# Patient Record
Sex: MALE | Race: WHITE | Employment: OTHER | URBAN - METROPOLITAN AREA
[De-identification: names, ages, dates, MRNs, and addresses within clinical notes are randomized per-mention and may not be internally consistent; named-entity substitution may affect disease eponyms.]

---

## 2021-05-02 ENCOUNTER — HOSPITAL ENCOUNTER (EMERGENCY)
Age: 66
Discharge: HOME | End: 2021-05-02
Attending: EMERGENCY MEDICINE
Payer: COMMERCIAL

## 2021-05-02 VITALS
WEIGHT: 175 LBS | TEMPERATURE: 98.1 F | OXYGEN SATURATION: 97 % | HEART RATE: 54 BPM | BODY MASS INDEX: 24.5 KG/M2 | RESPIRATION RATE: 12 BRPM | HEIGHT: 71 IN | DIASTOLIC BLOOD PRESSURE: 72 MMHG | SYSTOLIC BLOOD PRESSURE: 122 MMHG

## 2021-05-02 DIAGNOSIS — R55 NEAR SYNCOPE: Primary | ICD-10-CM

## 2021-05-02 LAB
ABSOLUTE EOS #: 0.4 K/UL (ref 0–0.4)
ABSOLUTE IMMATURE GRANULOCYTE: ABNORMAL K/UL (ref 0–0.3)
ABSOLUTE LYMPH #: 1.5 K/UL (ref 1–4.8)
ABSOLUTE MONO #: 0.9 K/UL (ref 0.1–1.2)
ALBUMIN SERPL-MCNC: 4.4 G/DL (ref 3.5–5.2)
ALBUMIN/GLOBULIN RATIO: 1.6 (ref 1–2.5)
ALP BLD-CCNC: 81 U/L (ref 40–129)
ALT SERPL-CCNC: 29 U/L (ref 5–41)
ANION GAP SERPL CALCULATED.3IONS-SCNC: 7 MMOL/L (ref 9–17)
AST SERPL-CCNC: 27 U/L
BASOPHILS # BLD: 1 % (ref 0–2)
BASOPHILS ABSOLUTE: 0.1 K/UL (ref 0–0.2)
BILIRUB SERPL-MCNC: 0.61 MG/DL (ref 0.3–1.2)
BUN BLDV-MCNC: 14 MG/DL (ref 8–23)
BUN/CREAT BLD: ABNORMAL (ref 9–20)
CALCIUM SERPL-MCNC: 9.6 MG/DL (ref 8.6–10.4)
CHLORIDE BLD-SCNC: 99 MMOL/L (ref 98–107)
CO2: 31 MMOL/L (ref 20–31)
CREAT SERPL-MCNC: 0.8 MG/DL (ref 0.7–1.2)
DIFFERENTIAL TYPE: ABNORMAL
EOSINOPHILS RELATIVE PERCENT: 5 % (ref 1–4)
GFR AFRICAN AMERICAN: >60 ML/MIN
GFR NON-AFRICAN AMERICAN: >60 ML/MIN
GFR SERPL CREATININE-BSD FRML MDRD: ABNORMAL ML/MIN/{1.73_M2}
GFR SERPL CREATININE-BSD FRML MDRD: ABNORMAL ML/MIN/{1.73_M2}
GLUCOSE BLD-MCNC: 140 MG/DL (ref 70–99)
HCT VFR BLD CALC: 46.2 % (ref 41–53)
HEMOGLOBIN: 15 G/DL (ref 13.5–17.5)
IMMATURE GRANULOCYTES: ABNORMAL %
LIPASE: 15 U/L (ref 13–60)
LYMPHOCYTES # BLD: 18 % (ref 24–44)
MCH RBC QN AUTO: 27.9 PG (ref 26–34)
MCHC RBC AUTO-ENTMCNC: 32.5 G/DL (ref 31–37)
MCV RBC AUTO: 86 FL (ref 80–100)
MONOCYTES # BLD: 11 % (ref 2–11)
NRBC AUTOMATED: ABNORMAL PER 100 WBC
PDW BLD-RTO: 15.8 % (ref 12.5–15.4)
PLATELET # BLD: 306 K/UL (ref 140–450)
PLATELET ESTIMATE: ABNORMAL
PMV BLD AUTO: 7.8 FL (ref 6–12)
POTASSIUM SERPL-SCNC: 4.1 MMOL/L (ref 3.7–5.3)
RBC # BLD: 5.37 M/UL (ref 4.5–5.9)
RBC # BLD: ABNORMAL 10*6/UL
SEG NEUTROPHILS: 65 % (ref 36–66)
SEGMENTED NEUTROPHILS ABSOLUTE COUNT: 5.5 K/UL (ref 1.8–7.7)
SODIUM BLD-SCNC: 137 MMOL/L (ref 135–144)
TOTAL PROTEIN: 7.2 G/DL (ref 6.4–8.3)
TROPONIN INTERP: NORMAL
TROPONIN T: NORMAL NG/ML
TROPONIN, HIGH SENSITIVITY: <6 NG/L (ref 0–22)
WBC # BLD: 8.3 K/UL (ref 3.5–11)
WBC # BLD: ABNORMAL 10*3/UL

## 2021-05-02 PROCEDURE — 84484 ASSAY OF TROPONIN QUANT: CPT

## 2021-05-02 PROCEDURE — 80053 COMPREHEN METABOLIC PANEL: CPT

## 2021-05-02 PROCEDURE — 93005 ELECTROCARDIOGRAM TRACING: CPT | Performed by: NURSE PRACTITIONER

## 2021-05-02 PROCEDURE — 99282 EMERGENCY DEPT VISIT SF MDM: CPT

## 2021-05-02 PROCEDURE — 83690 ASSAY OF LIPASE: CPT

## 2021-05-02 PROCEDURE — 2580000003 HC RX 258: Performed by: NURSE PRACTITIONER

## 2021-05-02 PROCEDURE — 85025 COMPLETE CBC W/AUTO DIFF WBC: CPT

## 2021-05-02 PROCEDURE — 36415 COLL VENOUS BLD VENIPUNCTURE: CPT

## 2021-05-02 PROCEDURE — 96360 HYDRATION IV INFUSION INIT: CPT

## 2021-05-02 RX ORDER — LOSARTAN POTASSIUM 25 MG/1
12.5 TABLET ORAL DAILY
COMMUNITY

## 2021-05-02 RX ORDER — 0.9 % SODIUM CHLORIDE 0.9 %
1000 INTRAVENOUS SOLUTION INTRAVENOUS ONCE
Status: COMPLETED | OUTPATIENT
Start: 2021-05-02 | End: 2021-05-02

## 2021-05-02 RX ADMIN — SODIUM CHLORIDE 1000 ML: 9 INJECTION, SOLUTION INTRAVENOUS at 12:29

## 2021-05-02 ASSESSMENT — ENCOUNTER SYMPTOMS
NAUSEA: 1
DIARRHEA: 0
BACK PAIN: 0
ABDOMINAL PAIN: 0
SHORTNESS OF BREATH: 0
VOMITING: 0

## 2021-05-02 NOTE — ED PROVIDER NOTES
[address] [facility] ED    [address] [facility] [address]  Phone: [phone]  Fax: [phone]  Emergency Department  Faculty Attestation    I performed a history and physical examination of the patient and discussed management with the mid level provideer. I reviewed the mid level provider's note and agree with the documented findings and plan of care. Any areas of disagreement are noted on the chart. I was personally present for the key portions of any procedures. I have documented in the chart those procedures where I was not present during the key portions. I have reviewed the emergency nurses triage note. I agree with the chief complaint, past medical history, past surgical history, allergies, medications, social and family history as documented unless otherwise noted below. Documentation of the HPI, Physical Exam and Medical Decision Making performed by medical students or scribes is based on my personal performance of the HPI, PE and MDM. For Physician Assistant/ Nurse Practitioner cases/documentation I have personally evaluated this patient and have completed at least one if not all key elements of the E/M (history, physical exam, and MDM). Additional findings are as noted.       Primary Care Physician:  [address] [place] [address]       Chief Complaint   Patient presents with    Loss of Consciousness     NEAR       RECENT VITALS:   Temp: 98.1 °F (36.7 °C),  Pulse: 54, Resp: 12, BP: 122/72    LABS:  Labs Reviewed   COMPREHENSIVE METABOLIC PANEL - Abnormal; Notable for the following components:       Result Value    Glucose 140 (*)     Anion Gap 7 (*)     All other components within normal limits   CBC WITH AUTO DIFFERENTIAL - Abnormal; Notable for the following components:    RDW 15.8 (*)     Lymphocytes 18 (*)     Eosinophils % 5 (*)     All other components within normal limits   LIPASE   TROPONIN         PERTINENT ATTENDING PHYSICIAN COMMENTS:    EKG: Emergency physician interpretation: Sinus 57 with no ischemia. Incomplete right bundle branch block noted. T wave is flatter in V2 compared to 5 years ago. Axis 60, , QRS 96, . The patient presents with a near syncopal episode this afternoon while he was using a weed [employer]. He is a diabetic but his blood sugar did not drop. He did not have any focal weakness or numbness. He denies chest pain or shortness of breath. He feels back to normal now. He felt a little bit short shaky prior to arrival.  On exam, the patient is to be no stress he has no focal deficits. He has normal heart and lung sounds. His lab work is reassuring as is his EKG. I think the patient likely is having some issues with staying hydrated when he is exerting himself. There is no evidence of acute focal deficits. He is asked to follow-up with his doctor. He is discharged in good condition.      Cayetano Bowser MD  05/02/21 6619

## 2021-05-02 NOTE — ED PROVIDER NOTES
[address] [facility] ED  Emergency Department Encounter  Mid Level Provider     Pt Name: Modesto Hammond  MRN: [id number]  [place] 1955  Date of evaluation: 5/2/21  PCP:  [address] [place] [address]       Chief Complaint   Patient presents with    Loss of Consciousness     NEAR       HISTORY OF PRESENT ILLNESS  (Location/Symptom, Timing/Onset,Context/Setting, Quality, Duration, Modifying Factors, Severity.)      Modesto Hammond is a 76 y.o. male who presents with episode of near syncope. He was outside working in the lawn and felt nauseated with generalized weakness. He walked into the home and his wife reported he looked pale. She did check his blood pressure and it was 92 systolic. This is low for him. Patient is a type I diabetic and his blood sugar was 129. Patient does have history of TIA and aortic stenosis. He is on Plavix. Denies history of MI. Patient was recently started on a new blood pressure medication 1 month ago. Patient has had a syncopal in the past but it was 10 years ago. Wife did give him some Gatorade. He is starting to feel better without treatment. PAST MEDICAL /SURGICAL / SOCIAL / FAMILY HISTORY      has a past medical history of Diabetes mellitus ([facility] [address] Hyperlipidemia, and Stroke ([facility] [address] has no past surgical history on file.     Social History     Socioeconomic History    Marital status: Single     Spouse name: Not on file    Number of children: Not on file    Years of education: Not on file    Highest education level: Not on file   Occupational History    Not on file   Social Needs    Financial resource strain: Not on file    Food insecurity     Worry: Not on file     Inability: Not on file    Transportation needs     Medical: Not on file     Non-medical: Not on file   Tobacco Use    Smoking status: Never Smoker   Substance and Sexual Activity    Alcohol use: No    Drug use: Not on file    Sexual activity: Not on file   Lifestyle    Physical activity nausea. Negative for abdominal pain, diarrhea and vomiting. Musculoskeletal: Negative for back pain. Allergic/Immunologic: Negative for immunocompromised state. Neurological: Positive for dizziness, syncope (Near) and weakness (Generalized). PHYSICALEXAM   (upto 7 for level 4, 8 or more for level 5)      INITIAL VITALS:  height is 5' 11\" (1.803 m) and weight is 79.4 kg (175 lb). His oral temperature is 98.1 °F (36.7 °C). His blood pressure is 122/72 and his pulse is 54. His respiration is 12 and oxygen saturation is 97%. Physical Exam  Vitals signs and nursing note reviewed. Constitutional:       General: He is not in acute distress. Appearance: He is well-developed. HENT:      Head: Normocephalic and atraumatic. Neck:      Musculoskeletal: Normal range of motion and neck supple. Cardiovascular:      Rate and Rhythm: Normal rate. Pulmonary:      Effort: Pulmonary effort is normal. No respiratory distress. Abdominal:      Palpations: Abdomen is soft. Tenderness: There is no abdominal tenderness. There is no guarding or rebound. Skin:     General: Skin is warm and dry. Neurological:      General: No focal deficit present. Mental Status: He is alert and oriented to person, place, and time. Psychiatric:         Mood and Affect: Mood normal.         Behavior: Behavior normal.         Thought Content:  Thought content normal.         Judgment: Judgment normal.         DIFFERENTIAL  DIAGNOSIS   Hypotension, hypoglycemia, dehydration, near syncope, dysrhythmia    PLAN (LABS / IMAGING / EKG):  Orders Placed This Encounter   Procedures    Comprehensive Metabolic Panel    Lipase    CBC Auto Differential    Troponin    EKG 12 Lead    Insert peripheral IV       MEDICATIONS ORDERED:  Orders Placed This Encounter   Medications    0.9 % sodium chloride bolus       Controlled Substances Monitoring:      DIAGNOSTIC RESULTS / EMERGENCY DEPARTMENT COURSE / MDM     RADIOLOGY:   I directly visualized(with the attending physician) the following  images and reviewed the radiologist interpretations:  No results found.     No orders to display       LABS:  Results for orders placed or performed during the hospital encounter of 05/02/21   Comprehensive Metabolic Panel   Result Value Ref Range    Glucose 140 (H) 70 - 99 mg/dL    BUN 14 8 - 23 mg/dL    CREATININE 0.80 0.70 - 1.20 mg/dL    Bun/Cre Ratio NOT REPORTED 9 - 20    Calcium 9.6 8.6 - 10.4 mg/dL    Sodium 137 135 - 144 mmol/L    Potassium 4.1 3.7 - 5.3 mmol/L    Chloride 99 98 - 107 mmol/L    CO2 31 20 - 31 mmol/L    Anion Gap 7 (L) 9 - 17 mmol/L    Alkaline Phosphatase 81 40 - 129 U/L    ALT 29 5 - 41 U/L    AST 27 <40 U/L    Total Bilirubin 0.61 0.3 - 1.2 mg/dL    Total Protein 7.2 6.4 - 8.3 g/dL    Albumin 4.4 3.5 - 5.2 g/dL    Albumin/Globulin Ratio 1.6 1.0 - 2.5    GFR Non-African American >60 >60 mL/min    GFR African American >60 >60 mL/min    GFR Comment          GFR Staging NOT REPORTED    Lipase   Result Value Ref Range    Lipase 15 13 - 60 U/L   CBC Auto Differential   Result Value Ref Range    WBC 8.3 3.5 - 11.0 k/uL    RBC 5.37 4.5 - 5.9 m/uL    Hemoglobin 15.0 13.5 - 17.5 g/dL    Hematocrit 46.2 41 - 53 %    MCV 86.0 80 - 100 fL    MCH 27.9 26 - 34 pg    MCHC 32.5 31 - 37 g/dL    RDW 15.8 (H) 12.5 - 15.4 %    Platelets 847 463 - 392 k/uL    MPV 7.8 6.0 - 12.0 fL    NRBC Automated NOT REPORTED per 100 WBC    Differential Type NOT REPORTED     Seg Neutrophils 65 36 - 66 %    Lymphocytes 18 (L) 24 - 44 %    Monocytes 11 2 - 11 %    Eosinophils % 5 (H) 1 - 4 %    Basophils 1 0 - 2 %    Immature Granulocytes NOT REPORTED 0 %    Segs Absolute 5.50 1.8 - 7.7 k/uL    Absolute Lymph # 1.50 1.0 - 4.8 k/uL    Absolute Mono # 0.90 0.1 - 1.2 k/uL    Absolute Eos # 0.40 0.0 - 0.4 k/uL    Basophils Absolute 0.10 0.0 - 0.2 k/uL    Absolute Immature Granulocyte NOT REPORTED 0.00 - 0.30 k/uL    WBC Morphology NOT REPORTED     RBC Morphology NOT

## 2021-05-04 LAB
EKG ATRIAL RATE: 57 BPM
EKG P AXIS: 59 DEGREES
EKG P-R INTERVAL: 152 MS
EKG Q-T INTERVAL: 416 MS
EKG QRS DURATION: 96 MS
EKG QTC CALCULATION (BAZETT): 404 MS
EKG R AXIS: 60 DEGREES
EKG T AXIS: 72 DEGREES
EKG VENTRICULAR RATE: 57 BPM

## 2021-06-03 ASSESSMENT — ENCOUNTER SYMPTOMS
BACK PAIN: 0
NAUSEA: 1
ABDOMINAL PAIN: 0
SHORTNESS OF BREATH: 0
DIARRHEA: 0
VOMITING: 0

## 2021-07-29 ENCOUNTER — APPOINTMENT (OUTPATIENT)
Dept: GENERAL RADIOLOGY | Age: 66
End: 2021-07-29
Payer: COMMERCIAL

## 2021-07-29 ENCOUNTER — HOSPITAL ENCOUNTER (EMERGENCY)
Age: 66
Discharge: HOME | End: 2021-07-29
Attending: EMERGENCY MEDICINE
Payer: COMMERCIAL

## 2021-07-29 VITALS
BODY MASS INDEX: 24.5 KG/M2 | OXYGEN SATURATION: 96 % | WEIGHT: 175 LBS | HEIGHT: 71 IN | HEART RATE: 44 BPM | RESPIRATION RATE: 15 BRPM | SYSTOLIC BLOOD PRESSURE: 139 MMHG | DIASTOLIC BLOOD PRESSURE: 68 MMHG | TEMPERATURE: 98.2 F

## 2021-07-29 DIAGNOSIS — R42 DIZZINESS: Primary | ICD-10-CM

## 2021-07-29 DIAGNOSIS — R42 LIGHTHEADEDNESS: ICD-10-CM

## 2021-07-29 LAB
ABSOLUTE EOS #: 0.4 K/UL (ref 0–0.4)
ABSOLUTE IMMATURE GRANULOCYTE: ABNORMAL K/UL (ref 0–0.3)
ABSOLUTE LYMPH #: 1.7 K/UL (ref 1–4.8)
ABSOLUTE MONO #: 0.7 K/UL (ref 0.1–1.2)
ANION GAP SERPL CALCULATED.3IONS-SCNC: 9 MMOL/L (ref 9–17)
BASOPHILS # BLD: 1 % (ref 0–2)
BASOPHILS ABSOLUTE: 0.1 K/UL (ref 0–0.2)
BUN BLDV-MCNC: 16 MG/DL (ref 8–23)
BUN/CREAT BLD: ABNORMAL (ref 9–20)
CALCIUM SERPL-MCNC: 9.5 MG/DL (ref 8.6–10.4)
CHLORIDE BLD-SCNC: 95 MMOL/L (ref 98–107)
CO2: 29 MMOL/L (ref 20–31)
CREAT SERPL-MCNC: 0.82 MG/DL (ref 0.7–1.2)
DIFFERENTIAL TYPE: ABNORMAL
EOSINOPHILS RELATIVE PERCENT: 5 % (ref 1–4)
GFR AFRICAN AMERICAN: >60 ML/MIN
GFR NON-AFRICAN AMERICAN: >60 ML/MIN
GFR SERPL CREATININE-BSD FRML MDRD: ABNORMAL ML/MIN/{1.73_M2}
GFR SERPL CREATININE-BSD FRML MDRD: ABNORMAL ML/MIN/{1.73_M2}
GLUCOSE BLD-MCNC: 109 MG/DL (ref 70–99)
HCT VFR BLD CALC: 43.8 % (ref 41–53)
HEMOGLOBIN: 14.5 G/DL (ref 13.5–17.5)
IMMATURE GRANULOCYTES: ABNORMAL %
LYMPHOCYTES # BLD: 22 % (ref 24–44)
MCH RBC QN AUTO: 29.5 PG (ref 26–34)
MCHC RBC AUTO-ENTMCNC: 33 G/DL (ref 31–37)
MCV RBC AUTO: 89.3 FL (ref 80–100)
MONOCYTES # BLD: 10 % (ref 2–11)
NRBC AUTOMATED: ABNORMAL PER 100 WBC
PDW BLD-RTO: 13.8 % (ref 12.5–15.4)
PLATELET # BLD: 286 K/UL (ref 140–450)
PLATELET ESTIMATE: ABNORMAL
PMV BLD AUTO: 7.8 FL (ref 6–12)
POTASSIUM SERPL-SCNC: 4.2 MMOL/L (ref 3.7–5.3)
RBC # BLD: 4.9 M/UL (ref 4.5–5.9)
RBC # BLD: ABNORMAL 10*6/UL
SEG NEUTROPHILS: 62 % (ref 36–66)
SEGMENTED NEUTROPHILS ABSOLUTE COUNT: 4.8 K/UL (ref 1.8–7.7)
SODIUM BLD-SCNC: 133 MMOL/L (ref 135–144)
TROPONIN INTERP: NORMAL
TROPONIN T: NORMAL NG/ML
TROPONIN, HIGH SENSITIVITY: 8 NG/L (ref 0–22)
WBC # BLD: 7.6 K/UL (ref 3.5–11)
WBC # BLD: ABNORMAL 10*3/UL

## 2021-07-29 PROCEDURE — 71046 X-RAY EXAM CHEST 2 VIEWS: CPT

## 2021-07-29 PROCEDURE — 84484 ASSAY OF TROPONIN QUANT: CPT

## 2021-07-29 PROCEDURE — 85025 COMPLETE CBC W/AUTO DIFF WBC: CPT

## 2021-07-29 PROCEDURE — 80048 BASIC METABOLIC PNL TOTAL CA: CPT

## 2021-07-29 PROCEDURE — 99284 EMERGENCY DEPT VISIT MOD MDM: CPT

## 2021-07-29 PROCEDURE — 36415 COLL VENOUS BLD VENIPUNCTURE: CPT

## 2021-07-29 PROCEDURE — 93005 ELECTROCARDIOGRAM TRACING: CPT | Performed by: PHYSICIAN ASSISTANT

## 2021-07-29 RX ORDER — ASPIRIN 81 MG/1
81 TABLET, CHEWABLE ORAL DAILY
COMMUNITY
End: 2022-08-31

## 2021-07-29 RX ORDER — METOPROLOL SUCCINATE 50 MG/1
50 TABLET, EXTENDED RELEASE ORAL DAILY
COMMUNITY
End: 2021-10-09

## 2021-07-29 NOTE — ED TRIAGE NOTES
Pt reports having cardiac cath on Monday at [facility] for A-Fib. Pt reports lightheadedness and dizziness x 4 days intermittently. Pt denies CP, SOB, N/V/D, HA, fever/chills, blurred vision.

## 2021-07-29 NOTE — ED PROVIDER NOTES
[address] [facility] ED  [address] [facility] [address]  Phone: [phone]  Fax: [phone]      eMERGENCY dEPARTMENT eNCOUnter      Pt Name: Rebecca Ferreira  MRN: [id number]  [place] 1955  Date of evaluation: 7/29/21      CHIEF COMPLAINT:  Chief Complaint   Patient presents with    Dizziness     intermittent x 4 days. HISTORY OF PRESENT ILLNESS    Rebecca Ferreira is a 76 y.o. male who presents with evaluation for DIZZINESS:       Context/Timing:  Lightheadedness while at [employer] day today. Was walking on slanted sidewalk and began to feel lightheadedness. No CP/palps or resp symptoms. History of lightheadedness, had stress and then Cardiac Cath on Monday of this week. No stents but started on Eliquis and Metoprolol. Hx dizziness? YES   Recent URI? NO  Ear pain? NO  Fall? NO  Head injury? NO  LOC? NO  Headache? NO  Vision changes? NO  Ataxia/instability? NO  TIA or CVA hx?     NO  Abd pain? NO  Nausea? YES  /  NO  Vomiting? NO  Neck/Back pain? NO  Paresthesias? NO  Chest pain? NO  Palpitations? NO  Cardiac hx? HTN, Afib      Recent med adjustments/changes? YES    Quality:  As above  Duration:  intermittent  Modifying Factors:  As above  Severity:   moderate    Nursing Notes were reviewed. REVIEW OF SYSTEMS       Constitutional: Denies recent fever, chills. HENT: per HPI  Neck: per HPI  Respiratory: Denies recent shortness of breath. Cardiac:  Per HPI  GI:  Per HPI. : Denies dysuria. Musculoskeletal: Per HPI  Neurologic:  Per HPI  Skin:  Denies any rash. Negative in 10 essential Systems except as mentioned above and in the HPI. PAST MEDICAL HISTORY   PMH:  has a past medical history of A-fib ([address] Diabetes mellitus ([address] Hyperlipidemia, and Stroke ([facility] [address] Surgical History:  has a past surgical history that includes Cardiac catheterization.   Social History:  reports that he has B-blocker started on Monday, this is likely source of bradycardia. 2100  Attending to complete all remaining care, diagnosis and disposition for this patient. We discussed this patient prior to my departure. My note will be refreshed to reflect these details, though I was not actively involved in this patient's care following the time stamp above. I have reviewed the disposition diagnosis with the patient and or their family/guardian. I have answered their questions and given discharge instructions. They voiced understanding of these instructions and did not have any further questions or complaints. This pt presents with the no signs of central etiology- ataxia, significant HA, localized weakness or other neurosensory deficits. No orders of the defined types were placed in this encounter. CONSULTS:  None      FINAL IMPRESSION      1. Dizziness    2.  Lightheadedness          DISPOSITION/PLAN:  DISPOSITION Decision To Discharge 07/29/2021 10:38:54 PM        PATIENT REFERRED TO:  [facility]  [address] [facility] [place],[facility] [address], [facility] [address]  [address]  [phone]    Call in 1 day        DISCHARGE MEDICATIONS:  Discharge Medication List as of 7/29/2021 10:55 PM          (Please note that portions of this note were completed with a voice recognition program.  Efforts were made to edit the dictations but occasionally words are mis-transcribed.)    CARMITA Gomez PA-C  08/03/21 0256

## 2021-07-30 LAB
EKG ATRIAL RATE: 44 BPM
EKG P AXIS: 49 DEGREES
EKG P-R INTERVAL: 184 MS
EKG Q-T INTERVAL: 460 MS
EKG QRS DURATION: 96 MS
EKG QTC CALCULATION (BAZETT): 393 MS
EKG R AXIS: 53 DEGREES
EKG T AXIS: 61 DEGREES
EKG VENTRICULAR RATE: 44 BPM

## 2021-07-30 NOTE — ED PROVIDER NOTES
[address] [facility] ED     Emergency Department     Faculty Attestation        I performed a history and physical examination of the patient and discussed management with the resident. I reviewed the residents note and agree with the documented findings and plan of care. Any areas of disagreement are noted on the chart. I was personally present for the key portions of any procedures. I have documented in the chart those procedures where I was not present during the key portions. I have reviewed the emergency nurses triage note. I agree with the chief complaint, past medical history, past surgical history, allergies, medications, social and family history as documented unless otherwise noted below. Documentation of the HPI, Physical Exam and Medical Decision Making performed by medical students or scribes is based on my personal performance of the HPI, PE and MDM. For Physician Assistant/ Nurse Practitioner cases/documentation I have have had a face to face evaluation with this patient and have completed at least one if not all key elements of the E/M (history, physical exam, and MDM). Additional findings are as noted. Vital Signs: BP (!) 155/72   Pulse (!) 46   Temp 98.2 °F (36.8 °C) (Oral)   Resp 15   Ht 5' 11\" (1.803 m)   Wt 79.4 kg (175 lb)   SpO2 97%   BMI 24.41 kg/m²   PCP:  Cy Yun    Pertinent Comments:     History: This is a 59-year-old male who presents today for dizziness that has been intermittent for the last 4 days. He has been put on some new medications for blood pressure. Exam: Patient's vitals show hypertension and bradycardia. The rest of his vitals are stable. His exam otherwise is benign for me at this time.     EKG Interpretation    Interpreted by me    Rhythm: Sinus bradycardia  Rate: Bradycardic  Axis: normal  Ectopy: none  Conduction: normal  ST Segments: no acute change  T Waves: no acute change  Q Waves: none    Clinical

## 2021-07-30 NOTE — ED NOTES
Pt given written and verbal discharge, f/u instructions. Pt verbalizes understanding. Pt is ambulatory with steady gait accompanied by wife.       Juan Manuel Feliciano RN  07/29/21 4233

## 2021-10-09 ENCOUNTER — HOSPITAL ENCOUNTER (EMERGENCY)
Age: 66
Discharge: HOME | End: 2021-10-09
Attending: EMERGENCY MEDICINE
Payer: COMMERCIAL

## 2021-10-09 ENCOUNTER — APPOINTMENT (OUTPATIENT)
Dept: CT IMAGING | Age: 66
End: 2021-10-09
Payer: COMMERCIAL

## 2021-10-09 VITALS
OXYGEN SATURATION: 99 % | TEMPERATURE: 97.6 F | WEIGHT: 175 LBS | HEIGHT: 71 IN | SYSTOLIC BLOOD PRESSURE: 150 MMHG | RESPIRATION RATE: 16 BRPM | DIASTOLIC BLOOD PRESSURE: 73 MMHG | HEART RATE: 55 BPM | BODY MASS INDEX: 24.5 KG/M2

## 2021-10-09 DIAGNOSIS — M54.50 ACUTE BILATERAL LOW BACK PAIN WITHOUT SCIATICA: Primary | ICD-10-CM

## 2021-10-09 LAB
ABSOLUTE EOS #: 0.1 K/UL (ref 0–0.4)
ABSOLUTE IMMATURE GRANULOCYTE: ABNORMAL K/UL (ref 0–0.3)
ABSOLUTE LYMPH #: 1.2 K/UL (ref 1–4.8)
ABSOLUTE MONO #: 0.7 K/UL (ref 0.1–1.2)
ALBUMIN SERPL-MCNC: 4.2 G/DL (ref 3.5–5.2)
ALBUMIN/GLOBULIN RATIO: 1.8 (ref 1–2.5)
ALP BLD-CCNC: 61 U/L (ref 40–129)
ALT SERPL-CCNC: 20 U/L (ref 5–41)
ANION GAP SERPL CALCULATED.3IONS-SCNC: 9 MMOL/L (ref 9–17)
AST SERPL-CCNC: 19 U/L
BASOPHILS # BLD: 1 % (ref 0–2)
BASOPHILS ABSOLUTE: 0.1 K/UL (ref 0–0.2)
BILIRUB SERPL-MCNC: 0.71 MG/DL (ref 0.3–1.2)
BILIRUBIN URINE: NEGATIVE
BUN BLDV-MCNC: 8 MG/DL (ref 8–23)
BUN/CREAT BLD: ABNORMAL (ref 9–20)
CALCIUM SERPL-MCNC: 9.3 MG/DL (ref 8.6–10.4)
CHLORIDE BLD-SCNC: 95 MMOL/L (ref 98–107)
CO2: 28 MMOL/L (ref 20–31)
COLOR: YELLOW
COMMENT UA: NORMAL
CREAT SERPL-MCNC: 0.63 MG/DL (ref 0.7–1.2)
DIFFERENTIAL TYPE: ABNORMAL
EOSINOPHILS RELATIVE PERCENT: 2 % (ref 1–4)
GFR AFRICAN AMERICAN: >60 ML/MIN
GFR NON-AFRICAN AMERICAN: >60 ML/MIN
GFR SERPL CREATININE-BSD FRML MDRD: ABNORMAL ML/MIN/{1.73_M2}
GFR SERPL CREATININE-BSD FRML MDRD: ABNORMAL ML/MIN/{1.73_M2}
GLUCOSE BLD-MCNC: 90 MG/DL (ref 70–99)
GLUCOSE URINE: NEGATIVE
HCT VFR BLD CALC: 45.2 % (ref 41–53)
HEMOGLOBIN: 15.1 G/DL (ref 13.5–17.5)
IMMATURE GRANULOCYTES: ABNORMAL %
KETONES, URINE: NEGATIVE
LEUKOCYTE ESTERASE, URINE: NEGATIVE
LIPASE: 18 U/L (ref 13–60)
LYMPHOCYTES # BLD: 18 % (ref 24–44)
MCH RBC QN AUTO: 29.8 PG (ref 26–34)
MCHC RBC AUTO-ENTMCNC: 33.4 G/DL (ref 31–37)
MCV RBC AUTO: 89.2 FL (ref 80–100)
MONOCYTES # BLD: 11 % (ref 2–11)
NITRITE, URINE: NEGATIVE
NRBC AUTOMATED: ABNORMAL PER 100 WBC
PDW BLD-RTO: 13.1 % (ref 12.5–15.4)
PH UA: 6.5 (ref 5–8)
PLATELET # BLD: 287 K/UL (ref 140–450)
PLATELET ESTIMATE: ABNORMAL
PMV BLD AUTO: 7.4 FL (ref 6–12)
POTASSIUM SERPL-SCNC: 4.2 MMOL/L (ref 3.7–5.3)
PROTEIN UA: NEGATIVE
RBC # BLD: 5.07 M/UL (ref 4.5–5.9)
RBC # BLD: ABNORMAL 10*6/UL
SEG NEUTROPHILS: 68 % (ref 36–66)
SEGMENTED NEUTROPHILS ABSOLUTE COUNT: 4.5 K/UL (ref 1.8–7.7)
SODIUM BLD-SCNC: 132 MMOL/L (ref 135–144)
SPECIFIC GRAVITY UA: 1.01 (ref 1–1.03)
TOTAL PROTEIN: 6.5 G/DL (ref 6.4–8.3)
TURBIDITY: CLEAR
URINE HGB: NEGATIVE
UROBILINOGEN, URINE: NORMAL
WBC # BLD: 6.6 K/UL (ref 3.5–11)
WBC # BLD: ABNORMAL 10*3/UL

## 2021-10-09 PROCEDURE — 83690 ASSAY OF LIPASE: CPT

## 2021-10-09 PROCEDURE — 85025 COMPLETE CBC W/AUTO DIFF WBC: CPT

## 2021-10-09 PROCEDURE — 2580000003 HC RX 258: Performed by: EMERGENCY MEDICINE

## 2021-10-09 PROCEDURE — 36415 COLL VENOUS BLD VENIPUNCTURE: CPT

## 2021-10-09 PROCEDURE — 2580000003 HC RX 258: Performed by: PHYSICIAN ASSISTANT

## 2021-10-09 PROCEDURE — 80053 COMPREHEN METABOLIC PANEL: CPT

## 2021-10-09 PROCEDURE — 99285 EMERGENCY DEPT VISIT HI MDM: CPT

## 2021-10-09 PROCEDURE — 74177 CT ABD & PELVIS W/CONTRAST: CPT

## 2021-10-09 PROCEDURE — 81003 URINALYSIS AUTO W/O SCOPE: CPT

## 2021-10-09 PROCEDURE — 6360000002 HC RX W HCPCS: Performed by: PHYSICIAN ASSISTANT

## 2021-10-09 PROCEDURE — 6360000004 HC RX CONTRAST MEDICATION: Performed by: EMERGENCY MEDICINE

## 2021-10-09 PROCEDURE — 96372 THER/PROPH/DIAG INJ SC/IM: CPT

## 2021-10-09 PROCEDURE — 2500000003 HC RX 250 WO HCPCS

## 2021-10-09 RX ORDER — SODIUM CHLORIDE 0.9 % (FLUSH) 0.9 %
10 SYRINGE (ML) INJECTION 2 TIMES DAILY
Status: DISCONTINUED | OUTPATIENT
Start: 2021-10-09 | End: 2021-10-09 | Stop reason: HOSPADM

## 2021-10-09 RX ORDER — ORPHENADRINE CITRATE 100 MG/1
100 TABLET, EXTENDED RELEASE ORAL 2 TIMES DAILY
Qty: 14 TABLET | Refills: 0 | Status: SHIPPED | OUTPATIENT
Start: 2021-10-09 | End: 2022-08-31

## 2021-10-09 RX ORDER — ORPHENADRINE CITRATE 30 MG/ML
60 INJECTION INTRAMUSCULAR; INTRAVENOUS ONCE
Status: COMPLETED | OUTPATIENT
Start: 2021-10-09 | End: 2021-10-09

## 2021-10-09 RX ORDER — 0.9 % SODIUM CHLORIDE 0.9 %
1000 INTRAVENOUS SOLUTION INTRAVENOUS ONCE
Status: COMPLETED | OUTPATIENT
Start: 2021-10-09 | End: 2021-10-09

## 2021-10-09 RX ORDER — KETOROLAC TROMETHAMINE 15 MG/ML
15 INJECTION, SOLUTION INTRAMUSCULAR; INTRAVENOUS ONCE
Status: DISCONTINUED | OUTPATIENT
Start: 2021-10-09 | End: 2021-10-09 | Stop reason: HOSPADM

## 2021-10-09 RX ORDER — DEXTROSE MONOHYDRATE 25 G/50ML
INJECTION, SOLUTION INTRAVENOUS
Status: COMPLETED
Start: 2021-10-09 | End: 2021-10-09

## 2021-10-09 RX ORDER — LIDOCAINE 50 MG/G
1-2 PATCH TOPICAL DAILY
Qty: 15 PATCH | Refills: 0 | Status: SHIPPED | OUTPATIENT
Start: 2021-10-09 | End: 2022-08-31

## 2021-10-09 RX ORDER — DEXTROSE MONOHYDRATE 25 G/50ML
7.25 INJECTION, SOLUTION INTRAVENOUS ONCE
Status: COMPLETED | OUTPATIENT
Start: 2021-10-09 | End: 2021-10-09

## 2021-10-09 RX ADMIN — IOPAMIDOL 75 ML: 755 INJECTION, SOLUTION INTRAVENOUS at 13:20

## 2021-10-09 RX ADMIN — SODIUM CHLORIDE, PRESERVATIVE FREE 10 ML: 5 INJECTION INTRAVENOUS at 13:20

## 2021-10-09 RX ADMIN — SODIUM CHLORIDE 1000 ML: 9 INJECTION, SOLUTION INTRAVENOUS at 13:15

## 2021-10-09 RX ADMIN — ORPHENADRINE CITRATE 60 MG: 30 INJECTION INTRAMUSCULAR; INTRAVENOUS at 12:18

## 2021-10-09 RX ADMIN — DEXTROSE MONOHYDRATE 7.25 G: 25 INJECTION, SOLUTION INTRAVENOUS at 13:34

## 2021-10-09 ASSESSMENT — PAIN DESCRIPTION - DESCRIPTORS: DESCRIPTORS: CONSTANT;ACHING

## 2021-10-09 ASSESSMENT — PAIN DESCRIPTION - ORIENTATION: ORIENTATION: RIGHT

## 2021-10-09 ASSESSMENT — PAIN SCALES - GENERAL: PAINLEVEL_OUTOF10: 5

## 2021-10-09 ASSESSMENT — PAIN DESCRIPTION - PAIN TYPE: TYPE: ACUTE PAIN

## 2021-10-09 ASSESSMENT — PAIN DESCRIPTION - FREQUENCY: FREQUENCY: CONTINUOUS

## 2021-10-09 NOTE — ED NOTES
Pt called out and is concerned about his blood sugar being low. Patient Dexcom reading BS 64, patient states he feels like hands are tingling which is a symptom of his low blood sugar. Discussed with Anitha Jimenez.      David Mcneil RN  10/09/21 2068

## 2021-10-09 NOTE — ED PROVIDER NOTES
Attending Supervisory Note/Shared Visit   I have personally performed a face to face diagnostic evaluation on this patient. I have reviewed the mid-levels findings and agree.         (Please note that portions of this note were completed with a voice recognition program.  Efforts were made to edit the dictations but occasionally words are mis-transcribed.)    Andrea Velasco MD  Attending Emergency Physician      Andrea Velasco MD  10/09/21 6859

## 2021-10-09 NOTE — ED PROVIDER NOTES
[address] [facility] ED  [address] \[facility] [place] [facility]\"" [address]  Phone: [phone]  Fax: [address]      Pt Name: Galdino Pitt  MRN: [id number]  [place] 1955  Date of evaluation: 10/9/2021  Provider: Michael Ulrich PA-C    CHIEF COMPLAINT       Chief Complaint   Patient presents with    Flank Pain     right side           HISTORY OF PRESENT ILLNESS  (Location/Symptom, Timing/Onset, Context/Setting, Quality, Duration, Modifying Factors, Severity.)   Galdino Pitt is a 76 y.o. male who presents to the emergency department for BACK PAIN complaint:     Context/timeframe: Patient here for evaluation of right-sided lower back and flank pain this been going on for about 3 days. Patient states it is much worse today and associated with single episode of vomiting following some nausea since yesterday. Pain by report is more associated with position change and movement. Patient states that when he gets up after sleeping or if he is at rest for a time that is significantly worse. There is no radiation of pain to abdomen or lower extremities. Patient denies any uro/nephro history. This has been an afebrile course and there is no specific trauma associated with the symptoms. Patient has had very little appetite over the last day or so. Patient is an insulin-dependent diabetic who states that his sugars have been running a little bit higher than normal but not concerning [place] by report. He is having no other ENT/chest/respiratory/urinary or significant stooling changes. Patient denies any surgical or abdominal history. Patient does have a history of A. fib for which she takes anticoagulation. Chronic  Neck/back pain:     NO  Location/Symptom:  Lower Back, R ight bilateral  Sciatica? NO  Spasms? NO  New rash? NO  Bladder/bowel incontinence? NO  Paresthesias? NO  Abdominal Pain? NO  Hx AAA or 1* relative? NO  Dysuria?    NO  Quality: Aching  Duration: Persistent  Modifying Factors: Worse with bending and twisting  Severity:  Moderate    Nursing Notes were reviewed. REVIEW OF SYSTEMS    (2-9 systems for level 4, 10 or more for level 5)     Review of Systems   Constitutional: Negative. HENT: Negative. Eyes: Negative. Respiratory: Negative. Cardiovascular: Negative. Gastrointestinal: Negative. Musculoskeletal: Back pain   Endocrine: Negative. Genitourinary: Negative. Skin: Negative. Allergic/Immunologic: Negative. Neurological: Negative. Hematological: Negative. Psychiatric/Behavioral: Negative. Except as noted above the remainder of the review of systems was reviewed and negative. PAST MEDICAL HISTORY         Diagnosis Date    A-fib [facility])     Aortic stenosis     Diabetes mellitus (HCC)     type 1    Hyperlipidemia     Stroke [facility])     no deficits       SURGICAL HISTORY           Procedure Laterality Date    CARDIAC CATHETERIZATION      7/26/21       CURRENT MEDICATIONS       Previous Medications    APIXABAN (ELIQUIS PO)    Take by mouth    ASPIRIN 81 MG CHEWABLE TABLET    Take 81 mg by mouth daily    INSULIN GLARGINE (LANTUS) 100 UNIT/ML INJECTION VIAL    Inject into the skin nightly    INSULIN LISPRO (HUMALOG) 100 UNIT/ML INJECTION VIAL    Inject into the skin 3 times daily (before meals)    LOSARTAN (COZAAR) 25 MG TABLET    Take 12.5 mg by mouth daily        ALLERGIES     Patient has no known allergies. FAMILY HISTORY     History reviewed. No pertinent family history. No family status information on file. SOCIAL HISTORY      reports that he has never smoked. He has never used smokeless tobacco. He reports that he does not drink alcohol and does not use drugs.     PHYSICAL EXAM    (up to 7 for level 4, 8 or more for level 5)     ED Triage Vitals [10/09/21 1139]   BP Temp Temp src Pulse Resp SpO2 Height Weight   138/83 -- -- 53 16 99 % 5' 11\" (1.803 m) 175 lb (79.4 kg)       Physical Exam Nursing note and vitals reviewed. Constitutional: Well-developed, well-nourished  Head: Normocephalic and atraumatic. Ear: External ears normal.   Nose: Nose normal and midline. Eyes: Conjunctivae and EOM are normal. Pupils are equal, round  Neck: Normal range of motion. Neck supple, no midline TTP. Cardiovascular: Normal rate, regular rhythm, normal heart sounds  Pulmonary/Chest: Effort normal and breath sounds normal. No respiratory distress. No wheezes. No rales. No chest tenderness. Abdominal: Soft. Bowel sounds are normal. No distension and no mass. There is no tenderness. There is no rebound and no guarding. No pulsatile mass. Musculoskeletal:  No paraspinal or midline back tenderness. Gestures to bilat lower back area as well as lateral right pelvis as area of pain. Pain with rocking in supine position. No pain with active SLR. No groin/acetab pain with int/ext rotation of BLE.   ROM cautious due to pain. No step-off deformity, no swelling, no bruising. No saddle paresthesias. No bilat flank TTP. NV intact distally x4. Neurological: Alert & age appropriate mentation/interaction   Skin: Skin is warm and dry. No vesicular rash noted. Psychiatric: Mood, memory, affect and judgment normal.           DIAGNOSTIC RESULTS     RADIOLOGY:   Non-plain film images such as CT, Ultrasound and MRI are read by the radiologist. Plain radiographic images are visualized and preliminarily interpreted by the emergency physician with the below findings:    Interpretation per the Radiologist below, if available at the time of this note:    CT ABDOMEN PELVIS W IV CONTRAST Additional Contrast? None   Final Result   1. No acute abnormality.                  LABS:  Labs Reviewed   CBC WITH AUTO DIFFERENTIAL - Abnormal; Notable for the following components:       Result Value    Seg Neutrophils 68 (*)     Lymphocytes 18 (*)     All other components within normal limits   COMPREHENSIVE METABOLIC PANEL - Abnormal; Notable for the following components:    CREATININE 0.63 (*)     Sodium 132 (*)     Chloride 95 (*)     All other components within normal limits   LIPASE   URINE RT REFLEX TO CULTURE         All other labs were within normal range or not returned as of this dictation. EMERGENCY DEPARTMENT COURSE and DIFFERENTIAL DIAGNOSIS/MDM:   Vitals:    Vitals:    10/09/21 1139   BP: 138/83   Pulse: 53   Resp: 16   SpO2: 99%   Weight: 79.4 kg (175 lb)   Height: 5' 11\" (1.803 m)       1225  Abd labs and CT ordered. This seems much more MS but with nausea/vomiting/age and DM hx we will r/o any concerning intra-abdominal etiologies. AVSS, nontoxic. CONSULTS:  None    PROCEDURES:  None    Attending discharged this patient after discussing all labwork/imaging results that were finalized. Treatment plan and recommended follow-up discussed with them as well. Norflex and Lidoderm rxs provided. PCP f/u in 2 days, return to ED for worsening symptoms. The patient presents with low back pain without signs of spinal cord compression, cauda equina syndrome, infection, aneurysm or other serious etiology. The patient is neurologically intact. Given the extremely low risk of these diagnoses further testing and evaluation for these possibilities does not appear to be indicated at this time. The patient has been instructed to return if the symptoms worsen or change in any way. FINAL IMPRESSION      1. Acute bilateral low back pain without sciatica          DISPOSITION/PLAN   DISPOSITION Decision To Discharge    PATIENT REFERRED TO:  Eleanor Silva  [address] [facility],[address], [address]  [facility] [address].  [phone]    Schedule an appointment as soon as possible for a visit in 2 days  for re-evaluation of your symptoms    [facility] ED  [address] [facility].   [address] [place], [place] [address] [phone]  Go to   for worsening of symptoms, increasing abdominal pain, fevers, vomiting      DISCHARGE MEDICATIONS:  New

## 2022-08-31 ENCOUNTER — APPOINTMENT (OUTPATIENT)
Dept: GENERAL RADIOLOGY | Age: 67
End: 2022-08-31
Payer: COMMERCIAL

## 2022-08-31 ENCOUNTER — HOSPITAL ENCOUNTER (OUTPATIENT)
Age: 67
Setting detail: OBSERVATION
Discharge: HOME | End: 2022-09-01
Attending: EMERGENCY MEDICINE | Admitting: HOSPITALIST
Payer: COMMERCIAL

## 2022-08-31 DIAGNOSIS — R00.1 SYMPTOMATIC BRADYCARDIA: Primary | ICD-10-CM

## 2022-08-31 LAB
ABSOLUTE EOS #: 0.4 K/UL (ref 0–0.4)
ABSOLUTE LYMPH #: 2.4 K/UL (ref 1–4.8)
ABSOLUTE MONO #: 0.9 K/UL (ref 0.1–1.2)
ALBUMIN SERPL-MCNC: 4 G/DL (ref 3.5–5.2)
ALBUMIN/GLOBULIN RATIO: 1.6 (ref 1–2.5)
ALP BLD-CCNC: 90 U/L (ref 40–129)
ALT SERPL-CCNC: 35 U/L (ref 5–41)
ANION GAP SERPL CALCULATED.3IONS-SCNC: 6 MMOL/L (ref 9–17)
AST SERPL-CCNC: 30 U/L
BASOPHILS # BLD: 1 % (ref 0–2)
BASOPHILS ABSOLUTE: 0.1 K/UL (ref 0–0.2)
BILIRUB SERPL-MCNC: 0.49 MG/DL (ref 0.3–1.2)
BUN BLDV-MCNC: 13 MG/DL (ref 8–23)
CALCIUM SERPL-MCNC: 9.4 MG/DL (ref 8.6–10.4)
CHLORIDE BLD-SCNC: 91 MMOL/L (ref 98–107)
CO2: 32 MMOL/L (ref 20–31)
CREAT SERPL-MCNC: 0.81 MG/DL (ref 0.7–1.2)
EOSINOPHILS RELATIVE PERCENT: 6 % (ref 1–4)
GFR AFRICAN AMERICAN: >60 ML/MIN
GFR NON-AFRICAN AMERICAN: >60 ML/MIN
GFR SERPL CREATININE-BSD FRML MDRD: ABNORMAL ML/MIN/{1.73_M2}
GLUCOSE BLD-MCNC: 112 MG/DL (ref 70–99)
HCT VFR BLD CALC: 41 % (ref 41–53)
HEMOGLOBIN: 14.4 G/DL (ref 13.5–17.5)
LYMPHOCYTES # BLD: 32 % (ref 24–44)
MAGNESIUM: 1.9 MG/DL (ref 1.6–2.6)
MCH RBC QN AUTO: 31.6 PG (ref 26–34)
MCHC RBC AUTO-ENTMCNC: 35 G/DL (ref 31–37)
MCV RBC AUTO: 90.5 FL (ref 80–100)
MONOCYTES # BLD: 12 % (ref 2–11)
PDW BLD-RTO: 12.8 % (ref 12.5–15.4)
PLATELET # BLD: 276 K/UL (ref 140–450)
PMV BLD AUTO: 7.3 FL (ref 6–12)
POTASSIUM SERPL-SCNC: 4.5 MMOL/L (ref 3.7–5.3)
PRO-BNP: 49 PG/ML
RBC # BLD: 4.54 M/UL (ref 4.5–5.9)
SEG NEUTROPHILS: 49 % (ref 36–66)
SEGMENTED NEUTROPHILS ABSOLUTE COUNT: 3.7 K/UL (ref 1.8–7.7)
SODIUM BLD-SCNC: 129 MMOL/L (ref 135–144)
TOTAL PROTEIN: 6.5 G/DL (ref 6.4–8.3)
TROPONIN, HIGH SENSITIVITY: 9 NG/L (ref 0–22)
WBC # BLD: 7.4 K/UL (ref 3.5–11)

## 2022-08-31 PROCEDURE — 83735 ASSAY OF MAGNESIUM: CPT

## 2022-08-31 PROCEDURE — 71045 X-RAY EXAM CHEST 1 VIEW: CPT

## 2022-08-31 PROCEDURE — 84484 ASSAY OF TROPONIN QUANT: CPT

## 2022-08-31 PROCEDURE — 85025 COMPLETE CBC W/AUTO DIFF WBC: CPT

## 2022-08-31 PROCEDURE — 36415 COLL VENOUS BLD VENIPUNCTURE: CPT

## 2022-08-31 PROCEDURE — 80053 COMPREHEN METABOLIC PANEL: CPT

## 2022-08-31 PROCEDURE — 93005 ELECTROCARDIOGRAM TRACING: CPT | Performed by: EMERGENCY MEDICINE

## 2022-08-31 PROCEDURE — 99285 EMERGENCY DEPT VISIT HI MDM: CPT

## 2022-08-31 PROCEDURE — 83880 ASSAY OF NATRIURETIC PEPTIDE: CPT

## 2022-08-31 RX ORDER — METOPROLOL SUCCINATE 25 MG/1
TABLET, EXTENDED RELEASE ORAL DAILY
Status: ON HOLD | COMMUNITY
End: 2022-09-01 | Stop reason: HOSPADM

## 2022-08-31 ASSESSMENT — PAIN - FUNCTIONAL ASSESSMENT: PAIN_FUNCTIONAL_ASSESSMENT: NONE - DENIES PAIN

## 2022-09-01 VITALS
SYSTOLIC BLOOD PRESSURE: 130 MMHG | HEART RATE: 53 BPM | TEMPERATURE: 97.8 F | OXYGEN SATURATION: 97 % | WEIGHT: 176 LBS | HEIGHT: 71 IN | BODY MASS INDEX: 24.64 KG/M2 | DIASTOLIC BLOOD PRESSURE: 69 MMHG | RESPIRATION RATE: 16 BRPM

## 2022-09-01 PROBLEM — R00.1 SYMPTOMATIC SINUS BRADYCARDIA: Status: ACTIVE | Noted: 2022-09-01

## 2022-09-01 LAB
ANION GAP SERPL CALCULATED.3IONS-SCNC: 7 MMOL/L (ref 9–17)
BILIRUBIN URINE: NEGATIVE
BUN BLDV-MCNC: 10 MG/DL (ref 8–23)
CALCIUM SERPL-MCNC: 8.9 MG/DL (ref 8.6–10.4)
CHLORIDE BLD-SCNC: 96 MMOL/L (ref 98–107)
CO2: 28 MMOL/L (ref 20–31)
COLOR: YELLOW
COMMENT UA: NORMAL
CREAT SERPL-MCNC: 0.8 MG/DL (ref 0.7–1.2)
GFR AFRICAN AMERICAN: >60 ML/MIN
GFR NON-AFRICAN AMERICAN: >60 ML/MIN
GFR SERPL CREATININE-BSD FRML MDRD: ABNORMAL ML/MIN/{1.73_M2}
GLUCOSE BLD-MCNC: 123 MG/DL (ref 70–99)
GLUCOSE URINE: NEGATIVE
KETONES, URINE: NEGATIVE
LEUKOCYTE ESTERASE, URINE: NEGATIVE
NITRITE, URINE: NEGATIVE
PH UA: 6 (ref 5–8)
POTASSIUM SERPL-SCNC: 4.2 MMOL/L (ref 3.7–5.3)
PROTEIN UA: NEGATIVE
SODIUM BLD-SCNC: 131 MMOL/L (ref 135–144)
SPECIFIC GRAVITY UA: 1.01 (ref 1–1.03)
TROPONIN, HIGH SENSITIVITY: 9 NG/L (ref 0–22)
TURBIDITY: CLEAR
URINE HGB: NEGATIVE
UROBILINOGEN, URINE: NORMAL

## 2022-09-01 PROCEDURE — G0378 HOSPITAL OBSERVATION PER HR: HCPCS

## 2022-09-01 PROCEDURE — 81003 URINALYSIS AUTO W/O SCOPE: CPT

## 2022-09-01 PROCEDURE — 36415 COLL VENOUS BLD VENIPUNCTURE: CPT

## 2022-09-01 PROCEDURE — 80048 BASIC METABOLIC PNL TOTAL CA: CPT

## 2022-09-01 PROCEDURE — 84484 ASSAY OF TROPONIN QUANT: CPT

## 2022-09-01 PROCEDURE — 99235 HOSP IP/OBS SAME DATE MOD 70: CPT | Performed by: HOSPITALIST

## 2022-09-01 RX ORDER — ONDANSETRON 4 MG/1
4 TABLET, ORALLY DISINTEGRATING ORAL EVERY 8 HOURS PRN
Status: DISCONTINUED | OUTPATIENT
Start: 2022-09-01 | End: 2022-09-01 | Stop reason: HOSPADM

## 2022-09-01 RX ORDER — DEXTROSE MONOHYDRATE 100 MG/ML
INJECTION, SOLUTION INTRAVENOUS CONTINUOUS PRN
Status: DISCONTINUED | OUTPATIENT
Start: 2022-09-01 | End: 2022-09-01 | Stop reason: HOSPADM

## 2022-09-01 RX ORDER — MAGNESIUM SULFATE 1 G/100ML
1000 INJECTION INTRAVENOUS PRN
Status: DISCONTINUED | OUTPATIENT
Start: 2022-09-01 | End: 2022-09-01 | Stop reason: HOSPADM

## 2022-09-01 RX ORDER — POLYETHYLENE GLYCOL 3350 17 G/17G
17 POWDER, FOR SOLUTION ORAL DAILY PRN
Status: DISCONTINUED | OUTPATIENT
Start: 2022-09-01 | End: 2022-09-01 | Stop reason: HOSPADM

## 2022-09-01 RX ORDER — SODIUM CHLORIDE 0.9 % (FLUSH) 0.9 %
10 SYRINGE (ML) INJECTION PRN
Status: DISCONTINUED | OUTPATIENT
Start: 2022-09-01 | End: 2022-09-01 | Stop reason: HOSPADM

## 2022-09-01 RX ORDER — SODIUM CHLORIDE 9 MG/ML
INJECTION, SOLUTION INTRAVENOUS PRN
Status: DISCONTINUED | OUTPATIENT
Start: 2022-09-01 | End: 2022-09-01 | Stop reason: HOSPADM

## 2022-09-01 RX ORDER — POTASSIUM CHLORIDE 7.45 MG/ML
10 INJECTION INTRAVENOUS PRN
Status: DISCONTINUED | OUTPATIENT
Start: 2022-09-01 | End: 2022-09-01 | Stop reason: HOSPADM

## 2022-09-01 RX ORDER — INSULIN GLARGINE 100 [IU]/ML
4 INJECTION, SOLUTION SUBCUTANEOUS NIGHTLY
Status: DISCONTINUED | OUTPATIENT
Start: 2022-09-01 | End: 2022-09-01 | Stop reason: HOSPADM

## 2022-09-01 RX ORDER — SODIUM CHLORIDE 0.9 % (FLUSH) 0.9 %
5-40 SYRINGE (ML) INJECTION EVERY 12 HOURS SCHEDULED
Status: DISCONTINUED | OUTPATIENT
Start: 2022-09-01 | End: 2022-09-01 | Stop reason: HOSPADM

## 2022-09-01 RX ORDER — ACETAMINOPHEN 650 MG/1
650 SUPPOSITORY RECTAL EVERY 6 HOURS PRN
Status: DISCONTINUED | OUTPATIENT
Start: 2022-09-01 | End: 2022-09-01 | Stop reason: HOSPADM

## 2022-09-01 RX ORDER — POTASSIUM CHLORIDE 20 MEQ/1
40 TABLET, EXTENDED RELEASE ORAL PRN
Status: DISCONTINUED | OUTPATIENT
Start: 2022-09-01 | End: 2022-09-01 | Stop reason: HOSPADM

## 2022-09-01 RX ORDER — ONDANSETRON 2 MG/ML
4 INJECTION INTRAMUSCULAR; INTRAVENOUS EVERY 6 HOURS PRN
Status: DISCONTINUED | OUTPATIENT
Start: 2022-09-01 | End: 2022-09-01 | Stop reason: HOSPADM

## 2022-09-01 RX ORDER — ACETAMINOPHEN 325 MG/1
650 TABLET ORAL EVERY 6 HOURS PRN
Status: DISCONTINUED | OUTPATIENT
Start: 2022-09-01 | End: 2022-09-01 | Stop reason: HOSPADM

## 2022-09-01 ASSESSMENT — ENCOUNTER SYMPTOMS
VOMITING: 0
SHORTNESS OF BREATH: 0
SORE THROAT: 0
DIARRHEA: 0
ABDOMINAL PAIN: 0
NAUSEA: 0

## 2022-09-01 NOTE — PLAN OF CARE
Problem: Discharge Planning  Goal: Discharge to home or other facility with appropriate resources  9/1/2022 0841 by Bernadene Landau, RN  Outcome: Completed  Flowsheets (Taken 9/1/2022 0800)  Discharge to home or other facility with appropriate resources: Identify barriers to discharge with patient and caregiver  9/1/2022 0420 by Lynn Mejia RN  Outcome: Progressing  Flowsheets (Taken 9/1/2022 0221)  Discharge to home or other facility with appropriate resources:   Identify barriers to discharge with patient and caregiver   Identify discharge learning needs (meds, wound care, etc)   Refer to discharge planning if patient needs post-hospital services based on physician order or complex needs related to functional status, cognitive ability or social support system    Patients questions and concerns addressed and answered. Problem: Cardiovascular - Adult  Goal: Maintains optimal cardiac output and hemodynamic stability  9/1/2022 0841 by Bernadene Landau, RN  Outcome: Completed  Flowsheets (Taken 9/1/2022 0800)  Maintains optimal cardiac output and hemodynamic stability: Monitor blood pressure and heart rate  9/1/2022 0420 by Lynn Mejia RN  Outcome: Progressing  Goal: Absence of cardiac dysrhythmias or at baseline  9/1/2022 0841 by Bernadene Landau, RN  Outcome: Completed  Flowsheets (Taken 9/1/2022 0800)  Absence of cardiac dysrhythmias or at baseline: Monitor cardiac rate and rhythm  9/1/2022 0420 by Lynn Mejia RN  Outcome: Progressing  Pt bradycardia resolved. Problem: ABCDS Injury Assessment  Goal: Absence of physical injury  Outcome: Completed   Fall assessment preformed. Bed in low locked position with call light and tray table within reach. Education given. Will continue to monitor.

## 2022-09-01 NOTE — ED NOTES
Pt to ER with wife, ambulated to room, steady gait. Pt reports feeling lightheaded and dizzy, started about 30 minutes PTA. Pt reports he took his pulse and BP, reports HR in the 40s and SPB 90's. Pt reports he called his cardiology who recommended he come to ER for eval. Pt denies CP/SOB, reports taking all meds as prescribed. Pt calm, cooperative, respers even non labored, skin warm pink, no distress, here for eval. IV established, labs sent, EKG done, cardiac monitor placed, will monitor closely.       Alphonso Pollard RN  08/31/22 0175

## 2022-09-01 NOTE — PLAN OF CARE
Problem: Cardiovascular - Adult  Goal: Maintains optimal cardiac output and hemodynamic stability  Outcome: Progressing  Goal: Absence of cardiac dysrhythmias or at baseline  Outcome: Progressing     Problem: Discharge Planning  Goal: Discharge to home or other facility with appropriate resources  Outcome: Progressing  Flowsheets (Taken 9/1/2022 0221)  Discharge to home or other facility with appropriate resources:   Identify barriers to discharge with patient and caregiver   Identify discharge learning needs (meds, wound care, etc)   Refer to discharge planning if patient needs post-hospital services based on physician order or complex needs related to functional status, cognitive ability or social support system

## 2022-09-01 NOTE — H&P
[facility]  Office: [address], DO, Christi Press, DO, Sakina Cushing, DO, Yessy Luis Blood, DO, Pete Valencia MD, Sarah Nye MD, Fadi Landry MD, Maryan Lord MD,  nIocencia Sanchez MD, Brii Omer MD, Ana María Glass, DO, Gibson Maldonado MD,  Tim Aparicio MD, Maddie Villeda MD, Uma Luna, DO, Rogelio Dixon MD, Maria T Rivera MD, Nicki Linares MD, Lindsey Sabillon MD, Benigno Tolliver MD, Qasim Celeste MD, Chucho Bell, DO, Angie Peter MD, Chuy Whitaker MD, Kraig Burrell, CNP,  Mich Iniguez, CNP, Portillo Zamudio, CNP, Celeste Valdes, CNP, Roylene Goodell, PA-C, Sandra Rand, DNP, Abeba Mahan, CNP, Misty Meadows, CNP, Jada East, CNP, Isabelle Akins, CNP, Ofilia Cornea, CNP, Almlorene Call, CNS, Shadia Jones, [facility], Shravanhemarquitaa Hoop, CNP, Carvel Pro, CNP, [place] Rodriguez, CNP         [facility]    HISTORY AND PHYSICAL EXAMINATION            Date:   9/1/2022  Patient name:  Yuliana Wick  Date of admission:  8/31/2022 11:19 PM  MRN:   [id number]  Account:  [de-identified]  YOB: 1955  PCP:    ESTEBAN Lara CNP  Room:   [address] [place] [address]  Code Status:    Full Code    Chief Complaint:     Chief Complaint   Patient presents with    Dizziness    Heart Problem     Low heart rate      Patient presents to the hospital with lightheadedness, patient states \"I feel better\"    History Obtained From:     patient, spouse, electronic medical record    History of Present Illness:     Yuliana Wick is a 78 y.o. Non- / non  male who presents with Dizziness and Heart Problem (Low heart rate )   and is admitted to the hospital for the management of Symptomatic sinus bradycardia. This very pleasant 79-year-old male with a known history of atrial fibrillation presented to the hospital with lightheadedness. He was found to have symptomatic bradycardia.   The patient had been on Metroprolol in the past however it had somehow been discontinued. He had contacted his cardiologist and metoprolol was reinitiated recently. The patient was taking 12.5 mg of Toprol daily. The patient was admitted and his Toprol was discontinued. His heart rate immediately corrected with discontinuation of his beta-blocker. Heart rate quickly improved from the 40s to the 70s with discontinuation of the offending medication. Had a long discussion with the patient as well as his wife at the bedside. At this point in time given the fact that his heart rate normalized following discontinuation of his metoprolol there is no indication for pacemaker. Recommendations are to follow-up with his cardiologist to discuss titration of his medications. The patient is to be discharged home with outpatient follow-up as his symptoms have resolved. Past Medical History:     Past Medical History:   Diagnosis Date    A-fib [facility])     Aortic stenosis     Diabetes mellitus ([facility] [address]     type 1    Hyperlipidemia     Stroke [facility])     no deficits        Past Surgical History:     Past Surgical History:   Procedure Laterality Date    CARDIAC CATHETERIZATION      7/26/21        Medications Prior to Admission:     Prior to Admission medications    Medication Sig Start Date End Date Taking? Authorizing Provider   Atorvastatin Calcium (LIPITOR PO) Take by mouth   Yes Historical Provider, MD   Apixaban (ELIQUIS PO) Take 5 mg by mouth 2 times daily    Historical Provider, MD   losartan (COZAAR) 25 MG tablet Take 12.5 mg by mouth daily     Historical Provider, MD   insulin lispro (HUMALOG) 100 UNIT/ML injection vial Inject into the skin 3 times daily (before meals)    Historical Provider, MD   insulin glargine (LANTUS) 100 UNIT/ML injection vial Inject into the skin nightly 4 units    Historical Provider, MD        Allergies:     Patient has no known allergies. Social History:     Tobacco:    reports that he has never smoked.  He has never supple, no carotid bruits, thyroid not palpable  Lungs: Bilateral equal air entry, clear to ausculation, no wheezing, rales or rhonchi, normal effort  Cardiovascular: normal rate, regular rhythm, no murmur, gallop, rub  Abdomen: Soft, nontender, nondistended, normal bowel sounds, no hepatomegaly or splenomegaly  Neurologic: There are no new focal motor or sensory deficits, normal muscle tone and bulk, no abnormal sensation, normal speech, cranial nerves II through XII grossly intact  Skin: No gross lesions, rashes, bruising or bleeding on exposed skin area  Extremities:  peripheral pulses palpable, no pedal edema or calf pain with palpation  Psych: normal affect     Investigations:      Laboratory Testing:  Recent Results (from the past 24 hour(s))   Comprehensive Metabolic Panel    Collection Time: 08/31/22 11:20 PM   Result Value Ref Range    Glucose 112 (H) 70 - 99 mg/dL    BUN 13 8 - 23 mg/dL    Creatinine 0.81 0.70 - 1.20 mg/dL    Calcium 9.4 8.6 - 10.4 mg/dL    Sodium 129 (L) 135 - 144 mmol/L    Potassium 4.5 3.7 - 5.3 mmol/L    Chloride 91 (L) 98 - 107 mmol/L    CO2 32 (H) 20 - 31 mmol/L    Anion Gap 6 (L) 9 - 17 mmol/L    Alkaline Phosphatase 90 40 - 129 U/L    ALT 35 5 - 41 U/L    AST 30 <40 U/L    Total Bilirubin 0.49 0.3 - 1.2 mg/dL    Total Protein 6.5 6.4 - 8.3 g/dL    Albumin 4.0 3.5 - 5.2 g/dL    Albumin/Globulin Ratio 1.6 1.0 - 2.5    GFR Non-African American >60 >60 mL/min    GFR African American >60 >60 mL/min    GFR Comment         CBC with Auto Differential    Collection Time: 08/31/22 11:20 PM   Result Value Ref Range    WBC 7.4 3.5 - 11.0 k/uL    RBC 4.54 4.5 - 5.9 m/uL    Hemoglobin 14.4 13.5 - 17.5 g/dL    Hematocrit 41.0 41 - 53 %    MCV 90.5 80 - 100 fL    MCH 31.6 26 - 34 pg    MCHC 35.0 31 - 37 g/dL    RDW 12.8 12.5 - 15.4 %    Platelets 797 641 - 931 k/uL    MPV 7.3 6.0 - 12.0 fL    Seg Neutrophils 49 36 - 66 %    Lymphocytes 32 24 - 44 %    Monocytes 12 (H) 2 - 11 %    Eosinophils % 6 (H) 1 - 4 %    Basophils 1 0 - 2 %    Segs Absolute 3.70 1.8 - 7.7 k/uL    Absolute Lymph # 2.40 1.0 - 4.8 k/uL    Absolute Mono # 0.90 0.1 - 1.2 k/uL    Absolute Eos # 0.40 0.0 - 0.4 k/uL    Basophils Absolute 0.10 0.0 - 0.2 k/uL   Troponin    Collection Time: 08/31/22 11:20 PM   Result Value Ref Range    Troponin, High Sensitivity 9 0 - 22 ng/L   Magnesium    Collection Time: 08/31/22 11:20 PM   Result Value Ref Range    Magnesium 1.9 1.6 - 2.6 mg/dL   Brain Natriuretic Peptide    Collection Time: 08/31/22 11:20 PM   Result Value Ref Range    Pro-BNP 49 <300 pg/mL   Urinalysis with Reflex to Culture    Collection Time: 09/01/22  1:18 AM    Specimen: Urine, clean catch   Result Value Ref Range    Color, UA Yellow Yellow    Turbidity UA Clear Clear    Glucose, Ur NEGATIVE NEGATIVE    Bilirubin Urine NEGATIVE NEGATIVE    Ketones, Urine NEGATIVE NEGATIVE    Specific Gravity, UA 1.015 1.005 - 1.030    Urine Hgb NEGATIVE NEGATIVE    pH, UA 6.0 5.0 - 8.0    Protein, UA NEGATIVE NEGATIVE    Urobilinogen, Urine Normal Normal    Nitrite, Urine NEGATIVE NEGATIVE    Leukocyte Esterase, Urine NEGATIVE NEGATIVE    Urinalysis Comments       Microscopic exam not performed based on chemical results unless requested in original order. Urinalysis Comments          Urinalysis Comments       Utilizing a urinalysis as the only screening method to exclude a potential uropathogen can be unreliable in many patient populations. Rapid screening tests are less sensitive than culture and if UTI is a clinical possibility, culture should be considered despite a negative urinalysis.      Troponin    Collection Time: 09/01/22  1:18 AM   Result Value Ref Range    Troponin, High Sensitivity 9 0 - 22 ng/L   Basic Metabolic Panel w/ Reflex to MG    Collection Time: 09/01/22  5:16 AM   Result Value Ref Range    Glucose 123 (H) 70 - 99 mg/dL    BUN 10 8 - 23 mg/dL    Creatinine 0.80 0.70 - 1.20 mg/dL    Calcium 8.9 8.6 - 10.4 mg/dL    Sodium 131 (L) 135 - 144 mmol/L    Potassium 4.2 3.7 - 5.3 mmol/L    Chloride 96 (L) 98 - 107 mmol/L    CO2 28 20 - 31 mmol/L    Anion Gap 7 (L) 9 - 17 mmol/L    GFR Non-African American >60 >60 mL/min    GFR African American >60 >60 mL/min    GFR Comment             Imaging/Diagnostics:  XR CHEST PORTABLE    Result Date: 9/1/2022  COPD without acute cardiopulmonary process.        Assessment :      Hospital Problems             Last Modified POA    * (Principal) Symptomatic sinus bradycardia 9/1/2022 Yes       Plan:     Patient status observation in the Med/Surge    Symptomatic bradycardia  Patient symptoms have improved dramatically with discontinuation of metoprolol, lightheadedness completely resolved  Discontinue Toprol with outpatient follow-up with cardiology  Paroxysmal atrial fibrillation  Patient maintains normal sinus rhythm  Continue Eliquis  Type 1 diabetes  Continue regimen of medications, no changes occurred while in the hospital    Discharge home with outpatient follow-up    Consultations:   [address] [place] [address]    Patient is admitted as to patient status    Gabrielle Aguilar DO  9/1/2022  10:54 AM    Copy sent to Dr. Kell Girard, APRN - CNP

## 2022-09-01 NOTE — ED PROVIDER NOTES
[id number] [facility] ED  [address] [address] [facility] [address] [facility] [address] [facility] [place] [address]  Phone: [phone]  Fax: [phone] [facility]          Pt Name: Yuliana Wick  MRN: [id number]  [place] 1955  Date of evaluation: 8/31/2022      CHIEF COMPLAINT       Chief Complaint   Patient presents with    Dizziness    Heart Problem     Low heart rate        HISTORY OF PRESENT ILLNESS       Yuliana Wcik is a 78 y.o. male who presents with lightheadedness/dizziness and bradycardia. Does have a history of atrial fibrillation and is on anticoagulants for that. Symptoms began this evening. He is on Lopressor as well but has been on that for quite some time. No other new medications or changes in medication doses recently. Denies chest pain or difficulty breathing. Denies other symptoms or concerns. No headache or neurologic symptoms. REVIEW OF SYSTEMS       Review of Systems   Constitutional:  Negative for chills and fever. HENT:  Negative for sore throat. Respiratory:  Negative for shortness of breath. Cardiovascular:  Negative for chest pain. Gastrointestinal:  Negative for abdominal pain, diarrhea, nausea and vomiting. Musculoskeletal:  Negative for neck pain. Skin:  Negative for rash. Neurological:  Positive for dizziness and light-headedness. Negative for weakness and numbness. PAST MEDICAL HISTORY    has a past medical history of A-fib [facility]), Aortic stenosis, Diabetes mellitus ([facility] [address] Hyperlipidemia, and Stroke ([facility] [address] SURGICAL HISTORY      has a past surgical history that includes Cardiac catheterization.     CURRENT MEDICATIONS       Previous Medications    APIXABAN (ELIQUIS PO)    Take 5 mg by mouth 2 times daily    ATORVASTATIN CALCIUM (LIPITOR PO)    Take by mouth    INSULIN GLARGINE (LANTUS) 100 UNIT/ML INJECTION VIAL    Inject into the skin nightly 4 units    INSULIN LISPRO (HUMALOG) 100 UNIT/ML INJECTION VIAL    Inject into the skin 3 times daily (before meals) LOSARTAN (COZAAR) 25 MG TABLET    Take 12.5 mg by mouth daily     METOPROLOL SUCCINATE (TOPROL XL) 25 MG EXTENDED RELEASE TABLET    Take by mouth daily 1/2 tab daily       ALLERGIES     has No Known Allergies. FAMILY HISTORY     has no family status information on file. family history is not on file. SOCIAL HISTORY      reports that he has never smoked. He has never used smokeless tobacco. He reports that he does not drink alcohol and does not use drugs. PHYSICAL EXAM     INITIAL VITALS:  height is 5' 11\" (1.803 m) and weight is 79.8 kg (176 lb). His oral temperature is 97.8 °F (36.6 °C). His blood pressure is 116/63 and his pulse is 45 (abnormal). His respiration is 16 and oxygen saturation is 96%. Physical Exam  Constitutional:       General: He is not in acute distress. Appearance: He is well-developed. HENT:      Head: Normocephalic and atraumatic. Right Ear: External ear normal.      Left Ear: External ear normal.   Eyes:      General: Lids are normal.         Right eye: No discharge. Left eye: No discharge. Neck:      Trachea: No tracheal deviation. Cardiovascular:      Rate and Rhythm: Normal rate and regular rhythm. Pulmonary:      Effort: Pulmonary effort is normal.      Breath sounds: Normal breath sounds. Abdominal:      Palpations: Abdomen is soft. Tenderness: There is no abdominal tenderness. Skin:     General: Skin is warm and dry. Neurological:      Mental Status: He is alert. GCS: GCS eye subscore is 4. GCS verbal subscore is 5. GCS motor subscore is 6. Psychiatric:         Behavior: Behavior normal.         DIFFERENTIAL DIAGNOSIS/ MDM:     Plan at this time be to initiate further cardiac work-up.     DIAGNOSTIC RESULTS     EKG: All EKG's are interpreted by the Emergency Department Physician who either signs or Co-signs this chart in the absence of a cardiologist.  Interpreted by Jose Kelly DO     Rhythm: sinus   Rate: 43  Axis: Normal  Ectopy: None  Conduction: Sinus bradycardia  ST Segments: No acute elevation depression  T Waves: No acute findings    Clinical Impression: Nonspecific ECG. Sinus rhythm. No acute infarction/ischemia noted. RADIOLOGY:   Interpretation per the Radiologist below, if available at the time of this note:  XR CHEST PORTABLE   Final Result   COPD without acute cardiopulmonary process. No results found.     LABS:  Results for orders placed or performed during the hospital encounter of 08/31/22   Comprehensive Metabolic Panel   Result Value Ref Range    Glucose 112 (H) 70 - 99 mg/dL    BUN 13 8 - 23 mg/dL    Creatinine 0.81 0.70 - 1.20 mg/dL    Calcium 9.4 8.6 - 10.4 mg/dL    Sodium 129 (L) 135 - 144 mmol/L    Potassium 4.5 3.7 - 5.3 mmol/L    Chloride 91 (L) 98 - 107 mmol/L    CO2 32 (H) 20 - 31 mmol/L    Anion Gap 6 (L) 9 - 17 mmol/L    Alkaline Phosphatase 90 40 - 129 U/L    ALT 35 5 - 41 U/L    AST 30 <40 U/L    Total Bilirubin 0.49 0.3 - 1.2 mg/dL    Total Protein 6.5 6.4 - 8.3 g/dL    Albumin 4.0 3.5 - 5.2 g/dL    Albumin/Globulin Ratio 1.6 1.0 - 2.5    GFR Non-African American >60 >60 mL/min    GFR African American >60 >60 mL/min    GFR Comment         CBC with Auto Differential   Result Value Ref Range    WBC 7.4 3.5 - 11.0 k/uL    RBC 4.54 4.5 - 5.9 m/uL    Hemoglobin 14.4 13.5 - 17.5 g/dL    Hematocrit 41.0 41 - 53 %    MCV 90.5 80 - 100 fL    MCH 31.6 26 - 34 pg    MCHC 35.0 31 - 37 g/dL    RDW 12.8 12.5 - 15.4 %    Platelets 491 442 - 357 k/uL    MPV 7.3 6.0 - 12.0 fL    Seg Neutrophils 49 36 - 66 %    Lymphocytes 32 24 - 44 %    Monocytes 12 (H) 2 - 11 %    Eosinophils % 6 (H) 1 - 4 %    Basophils 1 0 - 2 %    Segs Absolute 3.70 1.8 - 7.7 k/uL    Absolute Lymph # 2.40 1.0 - 4.8 k/uL    Absolute Mono # 0.90 0.1 - 1.2 k/uL    Absolute Eos # 0.40 0.0 - 0.4 k/uL    Basophils Absolute 0.10 0.0 - 0.2 k/uL   Troponin   Result Value Ref Range    Troponin, High Sensitivity 9 0 - 22 ng/L Troponin   Result Value Ref Range    Troponin, High Sensitivity 9 0 - 22 ng/L   Magnesium   Result Value Ref Range    Magnesium 1.9 1.6 - 2.6 mg/dL   Brain Natriuretic Peptide   Result Value Ref Range    Pro-BNP 49 <300 pg/mL       EMERGENCY DEPARTMENT COURSE:     The patient was given the following medications:  No orders of the defined types were placed in this encounter. Vitals:    Vitals:    09/01/22 0044 09/01/22 0059 09/01/22 0119 09/01/22 0129   BP: 108/63 109/61 115/77 116/63   Pulse: (!) 46 (!) 44 (!) 46 (!) 45   Resp: 13 13 12 16   Temp:       TempSrc:       SpO2: 95% 96% 98% 96%   Weight:       Height:         -------------------------  BP: 116/63, Temp: 97.8 °F (36.6 °C), Heart Rate: (!) 45, Resp: 16      Re-evaluation Notes    Patient is doing well on reevaluation. He remains asymptomatic at rest.  He does get symptomatic upon movement. Plan to be to admit. I spoke with hospitalist who accepted admission of the patient. No further events throughout his stay in the department. CONSULTS:    None    CRITICAL CARE:     None    PROCEDURES:    None    FINAL IMPRESSION      1. Symptomatic bradycardia          DISPOSITION/PLAN   DISPOSITION Admitted 09/01/2022 01:22:18 AM      Condition on Disposition    Improved    PATIENT REFERRED TO:  No follow-up provider specified.     DISCHARGE MEDICATIONS:  New Prescriptions    No medications on file       (Please note that portions of this note were completed with a voice recognition program.  Efforts were made to edit the dictations but occasionally words are mis-transcribed.)    Sunny Garza DO, DO  Attending Emergency Physician       Sunny Garza DO  09/01/22 0154

## 2022-09-01 NOTE — DISCHARGE SUMMARY
[facility]  Office: [address] [facility], , Lonny Zendejas, DO, Mayelin Jaramillo, DO, lAyse Velazquez Blood, DO, Pili Snell MD, Ayo Mitchell MD, Froylan Whitlock MD, Claudean Millard, MD,  Roz Severino MD, Roel Dudley MD, Valarie Kim, DO, Francis Bonner MD,  Randall Foster DO, Natalya Rivera MD, Janell Duckworth MD, Verla Kawasaki, DO, Serafin Gonzalez MD, Ciara Dupont MD, Brandee Arias MD, Emigdio Camejo MD, Talon Hernandez MD, Remedios Myers MD, Kenn Luis, DO, Syed Nur MD, Kavon Wheeler MD, Ernestine Camilo, CNP,  Aj Krishna, CNP, Clotilde Posada, CNP, Naa Abraham, CNP, Hillary Walker PA-C, Sumanth Rowan, [facility], Nicky Fong, CNP, Dilcia Ortiz, CNP, Mandi Hale, CNP, Tim Saint, CNP, Ryan Amaya, CNP, Eden Lentz, CNS, Jose Martin Gavin, [facility], Clifton Lala, CNP, Art Carmona, CNP, Vania Mcmahan, [facility]         [address] [facility]    Discharge Summary     Patient ID: Beverly Whitt  :  1955   MRN: [id number]     ACCOUNT:  [de-identified]   Patient's PCP: ESTEBAN Cooper CNP  Admit Date: 2022   Discharge Date: 2022     Length of Stay: 0  Code Status:  Full Code  Admitting Physician: Anjana Carrion DO  Discharge Physician: Anjana Carrion DO     Active Discharge Diagnoses:     Hospital Problem Lists:  Principal Problem:    Symptomatic sinus bradycardia  Resolved Problems:    * No resolved hospital problems. *      Admission Condition:  fair     Discharged Condition: good    Hospital Stay:     Hospital Course:  Beverly Whitt is a 78 y.o. male who was admitted for the management of  Symptomatic sinus bradycardia , presented to ER with Dizziness and Heart Problem (Low heart rate )    This very pleasant 56-year-old male presented to the hospital with dizziness/lightheadedness. He was found to have symptomatic bradycardia. He recently had started metoprolol.   The patient's metoprolol was discontinued and his dizziness/lightheadedness completely resolved. He has been instructed to discontinue this medication on discharge and follow-up with his primary cardiologist.  Patient is discharged in stable condition. Significant therapeutic interventions: As above    Significant Diagnostic Studies:   Labs / Micro:  CBC:   Lab Results   Component Value Date/Time    WBC 7.4 08/31/2022 11:20 PM    RBC 4.54 08/31/2022 11:20 PM    RBC 5.01 04/19/2012 11:47 AM    HGB 14.4 08/31/2022 11:20 PM    HCT 41.0 08/31/2022 11:20 PM    MCV 90.5 08/31/2022 11:20 PM    MCH 31.6 08/31/2022 11:20 PM    MCHC 35.0 08/31/2022 11:20 PM    RDW 12.8 08/31/2022 11:20 PM     08/31/2022 11:20 PM     04/19/2012 11:47 AM     BMP:    Lab Results   Component Value Date/Time    GLUCOSE 123 09/01/2022 05:16 AM    GLUCOSE 92 04/21/2012 07:23 AM     09/01/2022 05:16 AM    K 4.2 09/01/2022 05:16 AM    CL 96 09/01/2022 05:16 AM    CO2 28 09/01/2022 05:16 AM    ANIONGAP 7 09/01/2022 05:16 AM    BUN 10 09/01/2022 05:16 AM    CREATININE 0.80 09/01/2022 05:16 AM    BUNCRER NOT REPORTED 10/09/2021 12:20 PM    CALCIUM 8.9 09/01/2022 05:16 AM    LABGLOM >60 09/01/2022 05:16 AM    GFRAA >60 09/01/2022 05:16 AM    GFR      09/01/2022 05:16 AM        Radiology:  XR CHEST PORTABLE    Result Date: 9/1/2022  COPD without acute cardiopulmonary process. Consultations:    Consults:     Final Specialist Recommendations/Findings:   IP CONSULT TO RESPIRATORY CARE      The patient was seen and examined on day of discharge and this discharge summary is in conjunction with any daily progress note from day of discharge.     Discharge plan:     Disposition: Home    Physician Follow Up:     Kell Girard, APRN - CNP  [id number] [facility],[id number]  [facility] [id number]  [phone]    Follow up      [address], [place]  [address] [place] [address]  [phone]    Follow up         Requiring Further Evaluation/Follow Up POST HOSPITALIZATION/Incidental Findings: Follow-up with cardiology    Diet: diabetic diet    Activity: As tolerated    Instructions to Patient: None    Discharge Medications:      Medication List        CONTINUE taking these medications      ELIQUIS PO     insulin glargine 100 UNIT/ML injection vial  Commonly known as: LANTUS     insulin lispro 100 UNIT/ML injection vial  Commonly known as: HUMALOG     LIPITOR PO     losartan 25 MG tablet  Commonly known as: COZAAR            STOP taking these medications      metoprolol succinate 25 MG extended release tablet  Commonly known as: TOPROL XL              No discharge procedures on file. Time Spent on discharge is  20 mins in patient examination, evaluation, counseling as well as medication reconciliation, prescriptions for required medications, discharge plan and follow up. Electronically signed by   Karen Carroll DO  9/1/2022  11:01 AM      Thank you Dr. Santi Bob APRN - ARIANNA for the opportunity to be involved in this patient's care.

## 2022-09-02 LAB
EKG ATRIAL RATE: 43 BPM
EKG P AXIS: 53 DEGREES
EKG P-R INTERVAL: 186 MS
EKG Q-T INTERVAL: 468 MS
EKG QRS DURATION: 90 MS
EKG QTC CALCULATION (BAZETT): 395 MS
EKG R AXIS: 78 DEGREES
EKG T AXIS: 73 DEGREES
EKG VENTRICULAR RATE: 43 BPM